# Patient Record
Sex: MALE | Race: WHITE | NOT HISPANIC OR LATINO
[De-identification: names, ages, dates, MRNs, and addresses within clinical notes are randomized per-mention and may not be internally consistent; named-entity substitution may affect disease eponyms.]

---

## 2018-12-04 ENCOUNTER — APPOINTMENT (OUTPATIENT)
Dept: MRI IMAGING | Facility: HOSPITAL | Age: 78
End: 2018-12-04

## 2018-12-04 ENCOUNTER — OUTPATIENT (OUTPATIENT)
Dept: OUTPATIENT SERVICES | Facility: HOSPITAL | Age: 78
LOS: 1 days | End: 2018-12-04
Payer: MEDICARE

## 2018-12-04 DIAGNOSIS — Z00.8 ENCOUNTER FOR OTHER GENERAL EXAMINATION: ICD-10-CM

## 2018-12-04 PROCEDURE — 72148 MRI LUMBAR SPINE W/O DYE: CPT

## 2018-12-04 PROCEDURE — 72148 MRI LUMBAR SPINE W/O DYE: CPT | Mod: 26

## 2018-12-12 ENCOUNTER — OUTPATIENT (OUTPATIENT)
Dept: OUTPATIENT SERVICES | Facility: HOSPITAL | Age: 78
LOS: 1 days | Discharge: HOME | End: 2018-12-12
Payer: MEDICARE

## 2018-12-12 ENCOUNTER — APPOINTMENT (OUTPATIENT)
Dept: HEMATOLOGY ONCOLOGY | Facility: CLINIC | Age: 78
End: 2018-12-12
Payer: MEDICARE

## 2018-12-12 VITALS
HEART RATE: 75 BPM | RESPIRATION RATE: 16 BRPM | WEIGHT: 164.99 LBS | TEMPERATURE: 98 F | OXYGEN SATURATION: 98 % | HEIGHT: 67.76 IN | SYSTOLIC BLOOD PRESSURE: 161 MMHG | DIASTOLIC BLOOD PRESSURE: 79 MMHG | BODY MASS INDEX: 25.3 KG/M2

## 2018-12-12 DIAGNOSIS — Z78.9 OTHER SPECIFIED HEALTH STATUS: ICD-10-CM

## 2018-12-12 DIAGNOSIS — H91.91 UNSPECIFIED HEARING LOSS, RIGHT EAR: ICD-10-CM

## 2018-12-12 PROCEDURE — 99205 OFFICE O/P NEW HI 60 MIN: CPT

## 2018-12-12 NOTE — RESULTS/DATA
[FreeTextEntry1] : MRI L-spine-sclerotic foci at L3 and L4, suspicious for blastic metastases or myeloma. Spondylosis and degenerative spondylolisthesis with spinal stenosis at L4-5 and to a lesser degree L3-4

## 2018-12-12 NOTE — ASSESSMENT
[FreeTextEntry1] : Patient with bone lesions on MRI imaging of the lumbar spine suspicious for neoplastic disease per radiology. Discussed differential diagnosis with patient, along with further evaluation recommended. Rule out metastatic neoplasm-CT scans and PSA ordered. R/O plasma cell dyscrasia-lab work to be done.\par Pending the above, to decide regarding obtaining tissue diagnosis, with further oncologic management recommendations to be made pending this, and the above.\par Patient was given the opportunity to ask questions. His questions have been answered to his apparent satisfaction. Case discussed with Dr. Last-feels bone lesions not related to patient's current neurologic symptoms.

## 2018-12-12 NOTE — CONSULT LETTER
[Dear  ___] : Dear  [unfilled], [Consult Letter:] : I had the pleasure of evaluating your patient, [unfilled]. [Please see my note below.] : Please see my note below. [Consult Closing:] : Thank you very much for allowing me to participate in the care of this patient.  If you have any questions, please do not hesitate to contact me. [Sincerely,] : Sincerely, [FreeTextEntry3] : Louisa Barksdale M.D.

## 2018-12-12 NOTE — REVIEW OF SYSTEMS
[Patient Intake Form Reviewed] : Patient intake form was reviewed [Loss of Hearing] : loss of hearing [Negative] : Allergic/Immunologic [FreeTextEntry9] : LLE pain

## 2018-12-12 NOTE — PHYSICAL EXAM
[Normal] : normal spine exam without palpable tenderness, no kyphosis or scoliosis [de-identified] : superficial varicosities LLE >RLE [de-identified] : +left back nevus-regular border (patient states without any recent change) [de-identified] : no gross focal motor meuro deficits

## 2018-12-12 NOTE — HISTORY OF PRESENT ILLNESS
[de-identified] : 12/2018-Patient presented to a walk-in medical center with a several week history of left lower extremity pain. He was referred to a vascular physician, however, decided to see a neurologist in consultation (Dr. Last). He was placed on steroids which relieved his pain. MRI of the lumbar spine showed sclerotic foci at L3 and L4 most suspicious for blastic metastases or myeloma, or less likely bone islands. Spondylosis and degenerative spondylolisthesis and spinal stenosis at L4-L5 and to a lesser degree L3-L4.\par No complaints of anorexia or unexplained weight loss, fevers/night sweats, lymph node complaints. No pulmonary/GI complaints.

## 2018-12-15 ENCOUNTER — EMERGENCY (EMERGENCY)
Facility: HOSPITAL | Age: 78
LOS: 1 days | Discharge: HOME | End: 2018-12-15
Attending: EMERGENCY MEDICINE | Admitting: EMERGENCY MEDICINE
Payer: MEDICARE

## 2018-12-15 VITALS
HEIGHT: 68 IN | OXYGEN SATURATION: 98 % | SYSTOLIC BLOOD PRESSURE: 159 MMHG | DIASTOLIC BLOOD PRESSURE: 77 MMHG | TEMPERATURE: 98 F | WEIGHT: 154.98 LBS | RESPIRATION RATE: 16 BRPM | HEART RATE: 75 BPM

## 2018-12-15 PROCEDURE — 96372 THER/PROPH/DIAG INJ SC/IM: CPT

## 2018-12-15 PROCEDURE — 99283 EMERGENCY DEPT VISIT LOW MDM: CPT

## 2018-12-15 PROCEDURE — 99283 EMERGENCY DEPT VISIT LOW MDM: CPT | Mod: 25

## 2018-12-15 RX ORDER — KETOROLAC TROMETHAMINE 30 MG/ML
60 SYRINGE (ML) INJECTION ONCE
Qty: 0 | Refills: 0 | Status: DISCONTINUED | OUTPATIENT
Start: 2018-12-15 | End: 2018-12-15

## 2018-12-15 RX ADMIN — Medication 60 MILLIGRAM(S): at 10:49

## 2018-12-15 NOTE — ED PROVIDER NOTE - PHYSICAL EXAMINATION
AAOx3  Heart: s1/s2, RRR  Lung: CTA b/l  Abd: soft, NT/ND, no rebound or guarding, NCVAT  Extremity: no pedal edema or calf pain, no TTP on exam, FROM of all joints, negative SLR. 2+pedal pulses b/l   Neuro: patient moving all extremity equally, good strength and sensation in b/l extremity, no focal neuro deficits noted.    Patient ambulatory in the ED

## 2018-12-15 NOTE — ED PROVIDER NOTE - MEDICAL DECISION MAKING DETAILS
imp- left leg pain, most likely neuropathic pain given lumbar lesion. Patient does not want narcotics   plan- toradol, f/u with Dr. Last, discharge

## 2018-12-15 NOTE — ED ADULT NURSE NOTE - CHPI ED NUR SYMPTOMS NEG
no weakness/no abrasion/no stiffness/no back pain/no bruising/no deformity/no fever/no difficulty bearing weight/no numbness/no tingling

## 2018-12-15 NOTE — ED PROVIDER NOTE - OBJECTIVE STATEMENT
79 y/o M with PMH of BPH and recently diagnosed L3, L4 lesion presents to the ED with c/o intermittent episodes of sharp shooting pains from left mid thigh down to mid calf pain x 1 month, worsened 3 days ago. Patient seen by Dr. Last for this, recently finished a course of steroids yesterday, which he reports helped. Patient also following with Dr. Garza for lumbar lesion, next appointment is in 1 month. He denies bowel/bladder incontinence, paresthesia, numbness, urinary symptoms, abd pain, f/c, n/v or trauma

## 2018-12-15 NOTE — ED ADULT NURSE NOTE - OBJECTIVE STATEMENT
Pt c/o left leg pain from the mid thigh to the mid shin for 1 month worsening over the last 3 days, preventing sleep last night. Pt was seen for this pain and was given and finished a steroid course. Pt states took tylenol this AM and it is not helping the pain but states does not want any addictive medication. Pt presents without swelling, heat or redness to the left leg. Non tender to palpation. ROM normal.

## 2018-12-15 NOTE — ED PROVIDER NOTE - CARE PROVIDER_API CALL
Pawel Last (MD), Neurology  [address]  [address]  [place], [place] [id number]  Phone: [phone]  Fax: [phone]

## 2018-12-15 NOTE — ED PROVIDER NOTE - NSFOLLOWUPINSTRUCTIONS_ED_ALL_ED_FT
Follow up with Dr. Last as discusses and with Dr. Garza as scheduled    Alternate between over the counter Tylenol 1000mg every 6 hours and Motrin 600mg every 6 hours for pain    Stay hydrated    Return to the ER if your symptoms worsen, high fevers, severe pain or for any other medical emergencies

## 2018-12-15 NOTE — ED PROVIDER NOTE - ATTENDING CONTRIBUTION TO CARE
Juan Jose with [facility] owens. Patient with left leg pain, chronic. Alleviated at the start of his steroid course. The taper finished yesterday. He reports no change in the pain but needing pain relief. He does not want narcotic medication. Exam normal. Area of pain is between distal thigh and mid shaft tib/fib. ROM normal. No back tenderness. No fever. Well appearing. Normal gait. Advised tylenol/motrin alternates. Call Dr Last for f/u as there may be other meds that he could advise or recommend. I performed a face to face bedside interview with patient regarding history of present illness, review of symptoms and past medical history. I completed an independent physical exam.  I have discussed the patient's plan of care with Physician Assistant (PA). I agree with note as stated above, having amended the EMR as needed to reflect my findings.   This includes History of Present Illness, HIV, Past Medical/Surgical/Family/Social History, Allergies and Home Medications, Review of Systems, Physical Exam, and any Progress Notes during the time I functioned as the attending physician for this patient.

## 2018-12-16 ENCOUNTER — EMERGENCY (EMERGENCY)
Facility: HOSPITAL | Age: 78
LOS: 1 days | Discharge: HOME | End: 2018-12-16
Attending: EMERGENCY MEDICINE | Admitting: EMERGENCY MEDICINE
Payer: MEDICARE

## 2018-12-16 VITALS
TEMPERATURE: 98 F | HEART RATE: 64 BPM | SYSTOLIC BLOOD PRESSURE: 166 MMHG | OXYGEN SATURATION: 100 % | RESPIRATION RATE: 18 BRPM | DIASTOLIC BLOOD PRESSURE: 84 MMHG

## 2018-12-16 DIAGNOSIS — H91.90 UNSPECIFIED HEARING LOSS, UNSPECIFIED EAR: ICD-10-CM

## 2018-12-16 PROCEDURE — 69210 REMOVE IMPACTED EAR WAX UNI: CPT | Mod: 50

## 2018-12-16 PROCEDURE — 99282 EMERGENCY DEPT VISIT SF MDM: CPT | Mod: 25

## 2018-12-16 PROCEDURE — 99282 EMERGENCY DEPT VISIT SF MDM: CPT

## 2018-12-16 NOTE — ED PROVIDER NOTE - CARE PROVIDER_API CALL
Zuhair Wood), Otolaryngology  [address] [place] [address]  Phone: [phone]  Fax: [phone]

## 2018-12-16 NOTE — ED ADULT NURSE NOTE - NSIMPLEMENTINTERV_GEN_ALL_ED
Implemented All Universal Safety Interventions:  [place] to call system. Call bell, personal items and telephone within reach. Instruct patient to call for assistance. Room bathroom lighting operational. Non-slip footwear when patient is off stretcher. Physically safe environment: no spills, clutter or unnecessary equipment. Stretcher in lowest position, wheels locked, appropriate side rails in place.

## 2018-12-16 NOTE — ED PROVIDER NOTE - PROGRESS NOTE DETAILS
[facility] Clark: ears were irrigated, wax was removed. Patient reports he is able to hear now. he is stable for d.c with ENT f/u

## 2018-12-16 NOTE — ED PROVIDER NOTE - NSFOLLOWUPINSTRUCTIONS_ED_ALL_ED_FT
Follow up with the specialists, call the number above for an appointment   Stay hydrated  Return to the ER if your symptoms worsen, high fevers, severe pain or for any other medical emergencies

## 2018-12-16 NOTE — ED ADULT NURSE NOTE - CHPI ED NUR SYMPTOMS NEG
no loss of consciousness/no syncope/no vomiting/no weakness/no chills/no nausea/no fever/no bleeding gums/no numbness

## 2018-12-16 NOTE — ED PROVIDER NOTE - OBJECTIVE STATEMENT
77 y/o M presents to the ED with decreased hearing and feeling like his ears are clogged x today. Patient reports he used and OTC solvent for wax (possibly debrox irrigation system) today but it worsened his symptoms. He denies ear pain, discharge or bleeding from ears, dizziness, neuro symptoms, HA or all other complaints

## 2018-12-16 NOTE — ED PROVIDER NOTE - ATTENDING CONTRIBUTION TO CARE
Juan Jose with [facility]. Patient here today asking if we can check his left ear. He has been using Debrox without relief. Exam with cerumen impaction. Plan for irrigation. I performed a face to face bedside interview with patient regarding history of present illness, review of symptoms and past medical history. I completed an independent physical exam.  I have discussed the patient's plan of care with Physician Assistant (PA). I agree with note as stated above, having amended the EMR as needed to reflect my findings.   This includes History of Present Illness, HIV, Past Medical/Surgical/Family/Social History, Allergies and Home Medications, Review of Systems, Physical Exam, and any Progress Notes during the time I functioned as the attending physician for this patient.

## 2018-12-17 PROBLEM — N40.0 BENIGN PROSTATIC HYPERPLASIA WITHOUT LOWER URINARY TRACT SYMPTOMS: Chronic | Status: ACTIVE | Noted: 2018-12-15

## 2019-01-03 ENCOUNTER — LABORATORY RESULT (OUTPATIENT)
Age: 79
End: 2019-01-03

## 2019-01-04 ENCOUNTER — APPOINTMENT (OUTPATIENT)
Dept: ORTHOPEDIC SURGERY | Facility: CLINIC | Age: 79
End: 2019-01-04
Payer: MEDICARE

## 2019-01-04 VITALS — HEIGHT: 67 IN | BODY MASS INDEX: 25.74 KG/M2 | WEIGHT: 164 LBS

## 2019-01-04 DIAGNOSIS — Z00.00 ENCOUNTER FOR GENERAL ADULT MEDICAL EXAMINATION W/OUT ABNORMAL FINDINGS: ICD-10-CM

## 2019-01-04 PROCEDURE — 99204 OFFICE O/P NEW MOD 45 MIN: CPT

## 2019-01-04 PROCEDURE — 72110 X-RAY EXAM L-2 SPINE 4/>VWS: CPT

## 2019-01-04 PROCEDURE — 72170 X-RAY EXAM OF PELVIS: CPT

## 2019-01-07 ENCOUNTER — FORM ENCOUNTER (OUTPATIENT)
Age: 79
End: 2019-01-07

## 2019-01-08 ENCOUNTER — OUTPATIENT (OUTPATIENT)
Dept: OUTPATIENT SERVICES | Facility: HOSPITAL | Age: 79
LOS: 1 days | End: 2019-01-08
Payer: MEDICARE

## 2019-01-08 DIAGNOSIS — M89.9 DISORDER OF BONE, UNSPECIFIED: ICD-10-CM

## 2019-01-08 PROCEDURE — A9561: CPT

## 2019-01-08 PROCEDURE — 78306 BONE IMAGING WHOLE BODY: CPT | Mod: 26

## 2019-01-08 PROCEDURE — 78306 BONE IMAGING WHOLE BODY: CPT

## 2019-01-09 ENCOUNTER — FORM ENCOUNTER (OUTPATIENT)
Age: 79
End: 2019-01-09

## 2019-01-09 ENCOUNTER — OUTPATIENT (OUTPATIENT)
Dept: OUTPATIENT SERVICES | Facility: HOSPITAL | Age: 79
LOS: 1 days | Discharge: HOME | End: 2019-01-09

## 2019-01-09 DIAGNOSIS — C43.8 MALIGNANT MELANOMA OF OVERLAPPING SITES OF SKIN: ICD-10-CM

## 2019-01-10 ENCOUNTER — OUTPATIENT (OUTPATIENT)
Dept: OUTPATIENT SERVICES | Facility: HOSPITAL | Age: 79
LOS: 1 days | End: 2019-01-10
Payer: MEDICARE

## 2019-01-10 DIAGNOSIS — M89.9 DISORDER OF BONE, UNSPECIFIED: ICD-10-CM

## 2019-01-10 PROCEDURE — 71260 CT THORAX DX C+: CPT

## 2019-01-10 PROCEDURE — 71260 CT THORAX DX C+: CPT | Mod: 26

## 2019-01-10 PROCEDURE — 74177 CT ABD & PELVIS W/CONTRAST: CPT | Mod: 26

## 2019-01-10 PROCEDURE — 74177 CT ABD & PELVIS W/CONTRAST: CPT

## 2019-01-15 ENCOUNTER — APPOINTMENT (OUTPATIENT)
Dept: HEMATOLOGY ONCOLOGY | Facility: CLINIC | Age: 79
End: 2019-01-15
Payer: MEDICARE

## 2019-01-15 VITALS
TEMPERATURE: 97.8 F | BODY MASS INDEX: 25.34 KG/M2 | OXYGEN SATURATION: 100 % | RESPIRATION RATE: 17 BRPM | HEART RATE: 69 BPM | DIASTOLIC BLOOD PRESSURE: 83 MMHG | SYSTOLIC BLOOD PRESSURE: 155 MMHG | WEIGHT: 161.82 LBS

## 2019-01-15 DIAGNOSIS — M89.9 DISORDER OF BONE, UNSPECIFIED: ICD-10-CM

## 2019-01-15 PROCEDURE — 99214 OFFICE O/P EST MOD 30 MIN: CPT

## 2019-01-16 NOTE — HISTORY OF PRESENT ILLNESS
[de-identified] : 12/2018-Patient presented to a walk-in medical center with a several week history of left lower extremity pain. He was referred to a vascular physician, however, decided to see a neurologist in consultation (Dr. Last). He was placed on steroids which relieved his pain. MRI of the lumbar spine showed sclerotic foci at L3 and L4 most suspicious for blastic metastases or myeloma, or less likely bone islands. Spondylosis and degenerative spondylolisthesis and spinal stenosis at L4-L5 and to a lesser degree L3-L4.\par  [de-identified] : Dr. Kitchen (PCP) is managing patient's pain-ordering medication for him. Taking approx. 7-8 oxycodone tabs daily for the LLE pain which goes up/down the leg variably. Told has spinal stenosis but does not wish to pursue surgery. Has been referred for PT.\par No fevers/recent infection. No bleeding. [7 - Distress Level] : Distress Level: 7 [Referred Patient  to social work for follow-up] : Patient was referred to social work for follow-up

## 2019-01-16 NOTE — REVIEW OF SYSTEMS
[Negative] : Allergic/Immunologic [Mucosal Pain] : no mucosal pain [Fainting] : no fainting [FreeTextEntry9] : LLE pain

## 2019-01-16 NOTE — RESULTS/DATA
[FreeTextEntry1] : Whole-body bone scan-trauma anterior aspect of 2 left lower ribs (patient confirms history of trauma to this area); faintly increased uptake in the midthoracic spine, approximate T9. No abnormally increased or decreased radiopharmaceutical accumulation corresponding to the L3-L4 abnormalities identified on recent MRI. No other osseous abnormalities. \par CT chest/abdomen/pelvis-sclerotic foci in the bilateral iliac and ischial bones as well as right sacrum. Sclerotic lesions within L4 vertebral body and posterior elements of L3. No discrete osteolytic or osteosclerotic lesion at T9. Clustered ill-defined nodular opacities in the lung, indeterminate in etiology. Followup CT evaluation in 6 months suggested by radiology. 9 mm gastrohepatic lymph node and a 9 mm lymph node anterior to the distal thoracic esophagus noted.

## 2019-01-16 NOTE — ASSESSMENT
[FreeTextEntry1] : Bone lesions-as has been discussed with interventional radiology (Dr. Fry)-bone lesions radiographically may represent benign process, as well as pulmonary abnormality. Interval followup imaging to be done-patient expressed his understanding of this recommendation.\par Discussed differential diagnosis of IgM monoclonal gammopathy-clinically suspect this may represent MGUS, however, with bone issues as well, do think it prudent to pursue a bone marrow aspiration/biopsy to which patient consents. Indication, potential benefits and side effects of bone marrow procedure explained.\par Patient was given the opportunity to ask questions. His questions have been answered to his apparent satisfaction at this time.

## 2019-01-18 ENCOUNTER — OUTPATIENT (OUTPATIENT)
Dept: OUTPATIENT SERVICES | Facility: HOSPITAL | Age: 79
LOS: 1 days | End: 2019-01-18
Payer: MEDICARE

## 2019-01-18 DIAGNOSIS — M54.5 LOW BACK PAIN: ICD-10-CM

## 2019-01-30 ENCOUNTER — APPOINTMENT (OUTPATIENT)
Dept: HEMATOLOGY ONCOLOGY | Facility: CLINIC | Age: 79
End: 2019-01-30

## 2019-02-08 ENCOUNTER — APPOINTMENT (OUTPATIENT)
Dept: ORTHOPEDIC SURGERY | Facility: CLINIC | Age: 79
End: 2019-02-08
Payer: MEDICARE

## 2019-02-08 VITALS — WEIGHT: 161 LBS | HEIGHT: 67 IN | BODY MASS INDEX: 25.27 KG/M2

## 2019-02-08 DIAGNOSIS — D47.2 MONOCLONAL GAMMOPATHY: ICD-10-CM

## 2019-02-08 DIAGNOSIS — M48.061 SPINAL STENOSIS, LUMBAR REGION WITHOUT NEUROGENIC CLAUDICATION: ICD-10-CM

## 2019-02-08 PROCEDURE — 99213 OFFICE O/P EST LOW 20 MIN: CPT

## 2019-02-13 ENCOUNTER — OUTPATIENT (OUTPATIENT)
Dept: OUTPATIENT SERVICES | Facility: HOSPITAL | Age: 79
LOS: 1 days | Discharge: HOME | End: 2019-02-13

## 2019-02-13 DIAGNOSIS — C43.8 MALIGNANT MELANOMA OF OVERLAPPING SITES OF SKIN: ICD-10-CM

## 2019-02-15 ENCOUNTER — APPOINTMENT (OUTPATIENT)
Dept: ORTHOPEDIC SURGERY | Facility: CLINIC | Age: 79
End: 2019-02-15
Payer: MEDICARE

## 2019-02-15 VITALS — HEIGHT: 67 IN | WEIGHT: 161 LBS | BODY MASS INDEX: 25.27 KG/M2

## 2019-02-15 DIAGNOSIS — M54.16 RADICULOPATHY, LUMBAR REGION: ICD-10-CM

## 2019-02-15 PROCEDURE — 99213 OFFICE O/P EST LOW 20 MIN: CPT

## 2019-02-25 ENCOUNTER — APPOINTMENT (OUTPATIENT)
Dept: HEMATOLOGY ONCOLOGY | Facility: CLINIC | Age: 79
End: 2019-02-25

## 2019-03-13 NOTE — DISCUSSION/SUMMARY
[de-identified] : At this time, he will be sent for an Epidural injection and a script was provided. He is to bring a copy of his MRI and EMG report with him to the injection. We discussed the use of ice to relieve pain on his back. He will continue with physical therapy for strengthening and flexibility of both knees and continue with home exercises. He will also continue to walk with a cane in his right hand to prevent falls. Follow-up 2 weeks after his Epidural injection. WE have cautioned him to use the can and be cautius while walking for fear of falling.

## 2019-03-13 NOTE — PHYSICAL EXAM
[LE] : Sensory: Intact in bilateral lower extremities [de-identified] : Constitutional\par o Appearance : well-nourished, well developed, alert, in no acute distress \par Head and Face\par o Head :\par ¦ Inspection : atraumatic, normocephalic\par o Face :\par ¦ Inspection : no visible rash or discoloration\par Lymphatic\par o Epitrochlear Nodes : no lymphadenopathy \par o Popliteal Nodes : no palpable nodes present \par o Supraclavicular Nodes : no supraclavicular nodes present \par o Preauricular Nodes : no preauricular nodes present \par o Additional Nodes : no additional adenopathy present \par Skin and Subcutaneous Tissue \par o Head and Neck :\par ¦ Face : no facial lesions \par o Trunk :\par ¦ Back : no rashes present, no lesions present, no areas of discoloration \par Body Hair : body hair distribution normal for age\par Neurologic\par o Mental Status Examination :\par ¦ Orientation : alert and oriented X 3\par o Coordination : finger-to-nose-to-finger testing normal bilaterally, heel-shin cerebellar test within normal limits bilaterally \par Psychiatric\par o Mood and Affect: mood normal, affect appropriate \par Cardiovascular\par o Peripheral Vascular System :\par ¦ Femoral Artery : pulse 2+\par ¦ Dorsalis Pedis Artery : pulse 2+\par ¦ Posterior Tibial Artery : pulse 2+\par \par Lumbosacral Spine\par o Inspection : no lesions or deformities\par o Palpation : paraspinal musculature tender, left side worse than right \par o Stability : no subluxations present\par o Range of Motion : full ROM, extension and sidebending does not cause discomfort, flexion causes discomfort\par o Tests: Straight leg raising and Elis tests are negative, patella tendon reflex diminished on the left \par Patella Tendon Reflex: 2+ on the right, 1 on left\par Achilles Tendon Reflex: 2+ bilaterally\par Normal sensation for pin prick\par \par Right Lower Extremity\par o Buttock : no tenderness, swelling or deformities \par o Right Hip :\par ¦ Inspection/Palpation : no tenderness, swelling or deformities\par ¦ Range of Motion : full and painless in all planes, no crepitance\par ¦ Stability : joint stability intact\par ¦ Strength : extension, flexion, adduction, abduction, internal rotation and external rotation 4+/5 \par \par o Right Knee :\par ¦ Inspection/Palpation : no tenderness to palpation, no swelling, very tight hamstring\par ¦ Range of Motion : active flexion and extension full and painless, no crepitance\par ¦ Stability : no valgus or varus instability present on provocative testing\par ¦ Strength : flexion and extension 5/5,  no foot drop, ankle dorsiflexion and plantarflexion 5/5, ankle 5/5\par ¦ Tests and Signs : all tests for stability normal, no foot drop\par \par Left Lower Extremity\par o Buttock : no tenderness, swelling or deformities \par o Left Hip :\par ¦ Inspection/Palpation : no tenderness, no swelling or deformities\par ¦ Range of Motion : full and painless in all planes, no crepitance\par ¦ Stability : joint stability intact\par ¦ Strength : extension, flexion, adduction, abduction, internal rotation and external rotation 3-/5, can barely lift the leg. \par \par o Left Knee :\par ¦ Inspection/Palpation : no tenderness to palpation, no swelling, very tight hamstring\par ¦ Range of Motion : active flexion and extension full and painless, no crepitance, 15 degree extensor lag\par ¦ Stability : no valgus or varus instability present on provocative testing\par ¦ Strength : flexion and extension 3/5, no foot drop, ankle dorsiflexion and plantarflexion 4/5, ankle 5/5\par ¦ Tests and Signs : all tests for stability normal, no foot drop\par Patella tendon reflex decreased on the left one as opposed to 2+ on the right.  Achilles tendon reflex is symmetrical bilaterally\par \par Gait: abductor lurch on the left, walks with a cane in the right hand, kyphotic no significant extremity swelling or lymphedema, good core strength, very tight hamstrings bilaterally. Very poor initiation of motion due to weakness of the left leg. \par

## 2019-03-13 NOTE — ADDENDUM
[FreeTextEntry1] : I, Sam Dixon , acted solely as a scribe for Dr. Rayo Campoverde on this date 02/15/2019.\par All medical record entries made by the Scribe were at my, Dr. Rayo Campoverde, direction and personally dictated by me on 02/15/2019. I have reviewed the chart and agree that the record accurately reflects my personal performance of the history, physical exam, assessment and plan. I have also personally directed, reviewed, and agreed with the chart.

## 2019-03-13 NOTE — HISTORY OF PRESENT ILLNESS
[de-identified] : Patient went for an EMG/nerve conduction study of his lumbosacral spine which revealed a pinched nerve at the L4-5 level on the left side. The pain is overall unchanged, he still feels pain when standing. He walks with a cane in the right hand. He would like to discuss the possibility of an Epidural injection.He did have EMGNCS that revealed an L4/5 radiculopathy on the left. It appears unrelated to his metastatic CA.

## 2019-03-27 PROCEDURE — 97116 GAIT TRAINING THERAPY: CPT

## 2019-03-27 PROCEDURE — 97110 THERAPEUTIC EXERCISES: CPT

## 2019-03-27 PROCEDURE — 97161 PT EVAL LOW COMPLEX 20 MIN: CPT

## 2019-03-27 PROCEDURE — 97140 MANUAL THERAPY 1/> REGIONS: CPT

## 2019-04-05 ENCOUNTER — APPOINTMENT (OUTPATIENT)
Dept: ORTHOPEDIC SURGERY | Facility: CLINIC | Age: 79
End: 2019-04-05
Payer: MEDICARE

## 2019-04-05 VITALS — HEIGHT: 67 IN | BODY MASS INDEX: 25.27 KG/M2 | WEIGHT: 161 LBS

## 2019-04-05 DIAGNOSIS — M47.816 SPONDYLOSIS W/OUT MYELOPATHY OR RADICULOPATHY, LUMBAR REGION: ICD-10-CM

## 2019-04-05 DIAGNOSIS — M76.892 OTHER SPECIFIED ENTHESOPATHIES OF LEFT LOWER LIMB, EXCLUDING FOOT: ICD-10-CM

## 2019-04-05 PROCEDURE — 99213 OFFICE O/P EST LOW 20 MIN: CPT

## 2021-05-13 ENCOUNTER — APPOINTMENT (OUTPATIENT)
Dept: CARDIOLOGY | Facility: CLINIC | Age: 81
End: 2021-05-13
Payer: MEDICARE

## 2021-05-13 ENCOUNTER — NON-APPOINTMENT (OUTPATIENT)
Age: 81
End: 2021-05-13

## 2021-05-13 VITALS
WEIGHT: 169 LBS | RESPIRATION RATE: 16 BRPM | DIASTOLIC BLOOD PRESSURE: 71 MMHG | HEIGHT: 67 IN | BODY MASS INDEX: 26.53 KG/M2 | HEART RATE: 63 BPM | SYSTOLIC BLOOD PRESSURE: 146 MMHG | TEMPERATURE: 97.1 F | OXYGEN SATURATION: 99 %

## 2021-05-13 VITALS — HEART RATE: 68 BPM | SYSTOLIC BLOOD PRESSURE: 126 MMHG | DIASTOLIC BLOOD PRESSURE: 78 MMHG

## 2021-05-13 PROCEDURE — 99204 OFFICE O/P NEW MOD 45 MIN: CPT

## 2021-05-13 PROCEDURE — 93306 TTE W/DOPPLER COMPLETE: CPT

## 2021-05-13 PROCEDURE — 93000 ELECTROCARDIOGRAM COMPLETE: CPT

## 2021-05-13 NOTE — REASON FOR VISIT
[Other: ____] : [unfilled] [FreeTextEntry1] : This is an 80-year-old man referred for cardiac evaluation. The patient has recently been diagnosed with something that he states he is lives with all his life which is ADHD. He is being considered for some type of medical therapy. He is in general good health. He has no history of hypertension diabetes or smoking. He is a moderate alcohol user. Family history significant for a father who  in his 90s but had bypass surgery in his 70s. His grandfather  age 75 of a myocardial infarction.

## 2021-05-13 NOTE — ASSESSMENT
[FreeTextEntry1] : The patient was referred to the echocardiography laboratory. 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

## 2021-05-19 ENCOUNTER — APPOINTMENT (OUTPATIENT)
Dept: CARDIOLOGY | Facility: CLINIC | Age: 81
End: 2021-05-19
Payer: MEDICARE

## 2021-05-19 LAB
ALBUMIN SERPL ELPH-MCNC: 4.6 G/DL
ALP BLD-CCNC: 63 U/L
ALT SERPL-CCNC: 19 U/L
ANION GAP SERPL CALC-SCNC: 14 MMOL/L
AST SERPL-CCNC: 20 U/L
BASOPHILS # BLD AUTO: 0.05 K/UL
BASOPHILS NFR BLD AUTO: 0.5 %
BILIRUB SERPL-MCNC: 0.5 MG/DL
BUN SERPL-MCNC: 18 MG/DL
CALCIUM SERPL-MCNC: 9.7 MG/DL
CHLORIDE SERPL-SCNC: 100 MMOL/L
CHOLEST SERPL-MCNC: 166 MG/DL
CO2 SERPL-SCNC: 23 MMOL/L
CREAT SERPL-MCNC: 1.17 MG/DL
EOSINOPHIL # BLD AUTO: 0.13 K/UL
EOSINOPHIL NFR BLD AUTO: 1.2 %
GLUCOSE SERPL-MCNC: 89 MG/DL
HCT VFR BLD CALC: 40.7 %
HDLC SERPL-MCNC: 68 MG/DL
HGB BLD-MCNC: 13.3 G/DL
IMM GRANULOCYTES NFR BLD AUTO: 0.3 %
LDLC SERPL CALC-MCNC: 91 MG/DL
LYMPHOCYTES # BLD AUTO: 3.77 K/UL
LYMPHOCYTES NFR BLD AUTO: 36.1 %
MAN DIFF?: NORMAL
MCHC RBC-ENTMCNC: 30 PG
MCHC RBC-ENTMCNC: 32.7 GM/DL
MCV RBC AUTO: 91.7 FL
MONOCYTES # BLD AUTO: 0.75 K/UL
MONOCYTES NFR BLD AUTO: 7.2 %
NEUTROPHILS # BLD AUTO: 5.72 K/UL
NEUTROPHILS NFR BLD AUTO: 54.7 %
NONHDLC SERPL-MCNC: 98 MG/DL
PLATELET # BLD AUTO: 273 K/UL
POTASSIUM SERPL-SCNC: 4.4 MMOL/L
PROT SERPL-MCNC: 6.6 G/DL
RBC # BLD: 4.44 M/UL
RBC # FLD: 13.2 %
SODIUM SERPL-SCNC: 137 MMOL/L
T3 SERPL-MCNC: 77 NG/DL
T4 SERPL-MCNC: 6.4 UG/DL
TRIGL SERPL-MCNC: 37 MG/DL
TSH SERPL-ACNC: 3.9 UIU/ML
WBC # FLD AUTO: 10.45 K/UL

## 2021-05-19 PROCEDURE — 93015 CV STRESS TEST SUPVJ I&R: CPT

## 2022-06-21 ENCOUNTER — NON-APPOINTMENT (OUTPATIENT)
Age: 82
End: 2022-06-21

## 2022-06-21 ENCOUNTER — APPOINTMENT (OUTPATIENT)
Dept: CARDIOLOGY | Facility: CLINIC | Age: 82
End: 2022-06-21
Payer: MEDICARE

## 2022-06-21 VITALS
RESPIRATION RATE: 19 BRPM | OXYGEN SATURATION: 100 % | WEIGHT: 167 LBS | BODY MASS INDEX: 26.21 KG/M2 | HEIGHT: 67 IN | HEART RATE: 70 BPM | TEMPERATURE: 97.2 F

## 2022-06-21 VITALS — DIASTOLIC BLOOD PRESSURE: 78 MMHG | SYSTOLIC BLOOD PRESSURE: 130 MMHG | HEART RATE: 60 BPM

## 2022-06-21 DIAGNOSIS — F90.9 ATTENTION-DEFICIT HYPERACTIVITY DISORDER, UNSPECIFIED TYPE: ICD-10-CM

## 2022-06-21 PROCEDURE — 93000 ELECTROCARDIOGRAM COMPLETE: CPT

## 2022-06-21 PROCEDURE — 99214 OFFICE O/P EST MOD 30 MIN: CPT

## 2022-06-21 NOTE — ASSESSMENT
[FreeTextEntry1] : In summary, the patient is an 81-year-old man with no known history of heart disease and with a favorable noninvasive workup approximately one year ago.\par \par There is no cardiac contraindication to the use of Ritalin

## 2022-06-21 NOTE — REASON FOR VISIT
[FreeTextEntry1] : Patient presents for cardiac clearance to go on Ritalin for adhd. He has no complaints of chest discomfort shortness of breath palpitations dizziness or syncope. He has no prior cardiac history. He underwent a noninvasive workup including echocardiography and exercise stress testing approximately one year ago with favorable results. Takes no other medications.

## 2022-07-13 NOTE — ED ADULT NURSE NOTE - EXTENSIONS OF SELF_ADULT
Skyrizi Counseling: I discussed with the patient the risks of risankizumab-rzaa including but not limited to immunosuppression, and serious infections.  The patient understands that monitoring is required including a PPD at baseline and must alert us or the primary physician if symptoms of infection or other concerning signs are noted. None

## 2022-11-20 NOTE — ED PROVIDER NOTE - PHYSICAL EXAMINATION
Pt. Arrives to the ED endorsing chest pain x 3 days. Pt. Describes it as a crushing pain. Denies any cardiovascular hx. AAOx3  Heart: s1/s2, RRR  Lung: CTA b/l  Ears: cerumen impaction in b/l ears, unable to visualize TMs AAOx3  Heart: s1/s2, RRR  Lung: CTA b/l  Ears: cerumen impaction in b/l ears, unable to visualize TMs. no tenderness with tragus manipulation or insertion of otoscope

## 2023-01-13 ENCOUNTER — APPOINTMENT (OUTPATIENT)
Dept: SURGERY | Facility: CLINIC | Age: 83
End: 2023-01-13
Payer: MEDICARE

## 2023-01-13 VITALS — BODY MASS INDEX: 25.55 KG/M2 | TEMPERATURE: 97.3 F | HEIGHT: 66 IN | WEIGHT: 159 LBS

## 2023-01-13 DIAGNOSIS — R00.1 BRADYCARDIA, UNSPECIFIED: ICD-10-CM

## 2023-01-13 DIAGNOSIS — M43.17 SPONDYLOLISTHESIS, LUMBOSACRAL REGION: ICD-10-CM

## 2023-01-13 DIAGNOSIS — R94.31 ABNORMAL ELECTROCARDIOGRAM [ECG] [EKG]: ICD-10-CM

## 2023-01-13 DIAGNOSIS — Z87.438 PERSONAL HISTORY OF OTHER DISEASES OF MALE GENITAL ORGANS: ICD-10-CM

## 2023-01-13 PROCEDURE — 99203 OFFICE O/P NEW LOW 30 MIN: CPT

## 2023-01-13 NOTE — ASSESSMENT
[FreeTextEntry1] : Long discussion regarding all options and risks\par To undergo repair of RIH on 1/19/23\par Discussed with Medicine\par All lab values and imaging studies reviewed

## 2023-01-13 NOTE — PHYSICAL EXAM
[Normal Breath Sounds] : Normal breath sounds [Normal Heart Sounds] : normal heart sounds [Normal Rate and Rhythm] : normal rate and rhythm [Abdominal Masses] : No abdominal masses [Abdomen Tenderness] : ~T ~M No abdominal tenderness [No Rash or Lesion] : No rash or lesion [de-identified] : nl [de-identified] : nl [de-identified] : difficult to reduce RIH [de-identified] : nl

## 2023-01-13 NOTE — HISTORY OF PRESENT ILLNESS
[de-identified] : This 82 year old man has been aware of a RIH for the past several years. recently, the RIH has been uncomfortable and difficult to reduce. The patient denied any recent change in bowel movements.

## 2023-01-27 ENCOUNTER — OUTPATIENT (OUTPATIENT)
Dept: OUTPATIENT SERVICES | Facility: HOSPITAL | Age: 83
LOS: 1 days | End: 2023-01-27
Payer: MEDICARE

## 2023-01-27 VITALS
RESPIRATION RATE: 16 BRPM | DIASTOLIC BLOOD PRESSURE: 62 MMHG | WEIGHT: 163.14 LBS | HEART RATE: 56 BPM | TEMPERATURE: 97 F | OXYGEN SATURATION: 96 % | HEIGHT: 68 IN | SYSTOLIC BLOOD PRESSURE: 130 MMHG

## 2023-01-27 DIAGNOSIS — Z01.818 ENCOUNTER FOR OTHER PREPROCEDURAL EXAMINATION: ICD-10-CM

## 2023-01-27 DIAGNOSIS — K40.90 UNILATERAL INGUINAL HERNIA, WITHOUT OBSTRUCTION OR GANGRENE, NOT SPECIFIED AS RECURRENT: ICD-10-CM

## 2023-01-27 DIAGNOSIS — Z98.890 OTHER SPECIFIED POSTPROCEDURAL STATES: Chronic | ICD-10-CM

## 2023-01-27 LAB
ANION GAP SERPL CALC-SCNC: 9 MMOL/L — SIGNIFICANT CHANGE UP (ref 5–17)
BUN SERPL-MCNC: 32 MG/DL — HIGH (ref 7–23)
CALCIUM SERPL-MCNC: 9.4 MG/DL — SIGNIFICANT CHANGE UP (ref 8.4–10.5)
CHLORIDE SERPL-SCNC: 103 MMOL/L — SIGNIFICANT CHANGE UP (ref 96–108)
CO2 SERPL-SCNC: 27 MMOL/L — SIGNIFICANT CHANGE UP (ref 22–31)
CREAT SERPL-MCNC: 1.13 MG/DL — SIGNIFICANT CHANGE UP (ref 0.5–1.3)
EGFR: 65 ML/MIN/1.73M2 — SIGNIFICANT CHANGE UP
GLUCOSE SERPL-MCNC: 95 MG/DL — SIGNIFICANT CHANGE UP (ref 70–99)
HCT VFR BLD CALC: 40.9 % — SIGNIFICANT CHANGE UP (ref 39–50)
HGB BLD-MCNC: 13.6 G/DL — SIGNIFICANT CHANGE UP (ref 13–17)
MCHC RBC-ENTMCNC: 29.7 PG — SIGNIFICANT CHANGE UP (ref 27–34)
MCHC RBC-ENTMCNC: 33.3 GM/DL — SIGNIFICANT CHANGE UP (ref 32–36)
MCV RBC AUTO: 89.3 FL — SIGNIFICANT CHANGE UP (ref 80–100)
NRBC # BLD: 0 /100 WBCS — SIGNIFICANT CHANGE UP (ref 0–0)
PLATELET # BLD AUTO: 263 K/UL — SIGNIFICANT CHANGE UP (ref 150–400)
POTASSIUM SERPL-MCNC: 4.3 MMOL/L — SIGNIFICANT CHANGE UP (ref 3.5–5.3)
POTASSIUM SERPL-SCNC: 4.3 MMOL/L — SIGNIFICANT CHANGE UP (ref 3.5–5.3)
RBC # BLD: 4.58 M/UL — SIGNIFICANT CHANGE UP (ref 4.2–5.8)
RBC # FLD: 12.3 % — SIGNIFICANT CHANGE UP (ref 10.3–14.5)
SODIUM SERPL-SCNC: 139 MMOL/L — SIGNIFICANT CHANGE UP (ref 135–145)
WBC # BLD: 9.56 K/UL — SIGNIFICANT CHANGE UP (ref 3.8–10.5)
WBC # FLD AUTO: 9.56 K/UL — SIGNIFICANT CHANGE UP (ref 3.8–10.5)

## 2023-01-27 PROCEDURE — 85027 COMPLETE CBC AUTOMATED: CPT

## 2023-01-27 PROCEDURE — 36415 COLL VENOUS BLD VENIPUNCTURE: CPT

## 2023-01-27 PROCEDURE — G0463: CPT

## 2023-01-27 PROCEDURE — 80048 BASIC METABOLIC PNL TOTAL CA: CPT

## 2023-01-27 NOTE — H&P PST ADULT - WEIGHT IN LBS
Telephone Encounter by Chary Lara at 11/02/17 01:44 PM     Author:  Chary Lara Service:  (none) Author Type:  Patient      Filed:  11/02/17 01:44 PM Encounter Date:  11/1/2017 Status:  Signed     :  Chary Lara (Patient )            Spoke with patient.  Patient will call back to schedule appointment.[RB1.1M]       Revision History        User Key Date/Time User Provider Type Action    > RB1.1 11/02/17 01:44 PM Chary Lara Patient  Sign    M - Manual
163.1
No

## 2023-01-27 NOTE — H&P PST ADULT - NSICDXPASTMEDICALHX_GEN_ALL_CORE_FT
PAST MEDICAL HISTORY:  Benign prostatic hyperplasia, unspecified whether lower urinary tract symptoms present     Unilateral inguinal hernia, without obstruction or gangrene, not specified as recurrent

## 2023-01-27 NOTE — H&P PST ADULT - HISTORY OF PRESENT ILLNESS
This is an 83 y/o male with pre-operative diagnosis of right inguinal hernia.  Pt reports bulge in right groin for a few years, but now has become symptomatic.  Denies any changes bowel habits.  Otherwise patient feels well today.

## 2023-01-27 NOTE — H&P PST ADULT - NSANTHOSAYNRD_GEN_A_CORE
No. KINGA screening performed.  STOP BANG Legend: 0-2 = LOW Risk; 3-4 = INTERMEDIATE Risk; 5-8 = HIGH Risk

## 2023-02-01 ENCOUNTER — TRANSCRIPTION ENCOUNTER (OUTPATIENT)
Age: 83
End: 2023-02-01

## 2023-02-02 ENCOUNTER — RESULT REVIEW (OUTPATIENT)
Age: 83
End: 2023-02-02

## 2023-02-02 ENCOUNTER — TRANSCRIPTION ENCOUNTER (OUTPATIENT)
Age: 83
End: 2023-02-02

## 2023-02-02 ENCOUNTER — APPOINTMENT (OUTPATIENT)
Dept: SURGERY | Facility: HOSPITAL | Age: 83
End: 2023-02-02

## 2023-02-02 ENCOUNTER — OUTPATIENT (OUTPATIENT)
Dept: OUTPATIENT SERVICES | Facility: HOSPITAL | Age: 83
LOS: 1 days | End: 2023-02-02
Payer: MEDICARE

## 2023-02-02 VITALS
HEIGHT: 68 IN | WEIGHT: 163.14 LBS | HEART RATE: 64 BPM | DIASTOLIC BLOOD PRESSURE: 73 MMHG | TEMPERATURE: 98 F | RESPIRATION RATE: 15 BRPM | SYSTOLIC BLOOD PRESSURE: 160 MMHG | OXYGEN SATURATION: 99 %

## 2023-02-02 VITALS
SYSTOLIC BLOOD PRESSURE: 121 MMHG | RESPIRATION RATE: 14 BRPM | OXYGEN SATURATION: 95 % | HEART RATE: 66 BPM | TEMPERATURE: 98 F | DIASTOLIC BLOOD PRESSURE: 72 MMHG

## 2023-02-02 DIAGNOSIS — Z98.890 OTHER SPECIFIED POSTPROCEDURAL STATES: Chronic | ICD-10-CM

## 2023-02-02 DIAGNOSIS — K40.90 UNILATERAL INGUINAL HERNIA, WITHOUT OBSTRUCTION OR GANGRENE, NOT SPECIFIED AS RECURRENT: ICD-10-CM

## 2023-02-02 DIAGNOSIS — D10.1 BENIGN NEOPLASM OF TONGUE: Chronic | ICD-10-CM

## 2023-02-02 PROCEDURE — 49505 PRP I/HERN INIT REDUC >5 YR: CPT | Mod: RT

## 2023-02-02 PROCEDURE — 88302 TISSUE EXAM BY PATHOLOGIST: CPT

## 2023-02-02 PROCEDURE — 49505 PRP I/HERN INIT REDUC >5 YR: CPT | Mod: AS

## 2023-02-02 PROCEDURE — C1781: CPT

## 2023-02-02 PROCEDURE — 88302 TISSUE EXAM BY PATHOLOGIST: CPT | Mod: 26

## 2023-02-02 PROCEDURE — 49505 PRP I/HERN INIT REDUC >5 YR: CPT

## 2023-02-02 DEVICE — MESH HERNIA PERFIX PLUG EXTRA LARGE 1.6 X 2": Type: IMPLANTABLE DEVICE | Status: FUNCTIONAL

## 2023-02-02 RX ORDER — SODIUM CHLORIDE 9 MG/ML
1000 INJECTION, SOLUTION INTRAVENOUS
Refills: 0 | Status: DISCONTINUED | OUTPATIENT
Start: 2023-02-02 | End: 2023-02-02

## 2023-02-02 RX ORDER — CHOLECALCIFEROL (VITAMIN D3) 125 MCG
1 CAPSULE ORAL
Qty: 0 | Refills: 0 | DISCHARGE

## 2023-02-02 RX ORDER — HYDROMORPHONE HYDROCHLORIDE 2 MG/ML
0.5 INJECTION INTRAMUSCULAR; INTRAVENOUS; SUBCUTANEOUS
Refills: 0 | Status: DISCONTINUED | OUTPATIENT
Start: 2023-02-02 | End: 2023-02-02

## 2023-02-02 RX ORDER — ONDANSETRON 8 MG/1
4 TABLET, FILM COATED ORAL ONCE
Refills: 0 | Status: DISCONTINUED | OUTPATIENT
Start: 2023-02-02 | End: 2023-02-02

## 2023-02-02 RX ORDER — TAMSULOSIN HYDROCHLORIDE 0.4 MG/1
1 CAPSULE ORAL
Qty: 0 | Refills: 0 | DISCHARGE

## 2023-02-02 RX ORDER — OXYCODONE HYDROCHLORIDE 5 MG/1
1 TABLET ORAL
Qty: 6 | Refills: 0
Start: 2023-02-02 | End: 2023-02-04

## 2023-02-02 RX ORDER — SENNA PLUS 8.6 MG/1
1 TABLET ORAL
Qty: 0 | Refills: 0 | DISCHARGE

## 2023-02-02 RX ADMIN — SODIUM CHLORIDE 50 MILLILITER(S): 9 INJECTION, SOLUTION INTRAVENOUS at 07:38

## 2023-02-02 NOTE — ASU DISCHARGE PLAN (ADULT/PEDIATRIC) - NS MD DC FALL RISK RISK
For information on Fall & Injury Prevention, visit: [email or web address] OR  [email or web address] OR  https://www.cdc.gov/steadi/patient.html

## 2023-02-02 NOTE — ASU DISCHARGE PLAN (ADULT/PEDIATRIC) - CALL YOUR DOCTOR IF YOU HAVE ANY OF THE FOLLOWING:
Bleeding that does not stop/Pain not relieved by Medications/Fever greater than (need to indicate Fahrenheit or Celsius)/Wound/Surgical Site with redness, or foul smelling discharge or pus/Nausea and vomiting that does not stop/Unable to urinate/Increased irritability or sluggishness Bleeding that does not stop/Swelling that gets worse/Pain not relieved by Medications/Fever greater than (need to indicate Fahrenheit or Celsius)/Wound/Surgical Site with redness, or foul smelling discharge or pus/Nausea and vomiting that does not stop/Unable to urinate/Inability to tolerate liquids or foods/Increased irritability or sluggishness

## 2023-02-02 NOTE — ASU PATIENT PROFILE, ADULT - NSICDXPASTMEDICALHX_GEN_ALL_CORE_FT
PAST MEDICAL HISTORY:  Benign prostatic hyperplasia, unspecified whether lower urinary tract symptoms present     Unilateral inguinal hernia, without obstruction or gangrene, not specified as recurrent      PAST MEDICAL HISTORY:  ADHD     Benign prostatic hyperplasia, unspecified whether lower urinary tract symptoms present     Unilateral inguinal hernia, without obstruction or gangrene, not specified as recurrent

## 2023-02-02 NOTE — BRIEF OPERATIVE NOTE - NSICDXBRIEFPROCEDURE_GEN_ALL_CORE_FT
PROCEDURES:  Repair, hernia, inguinal, open, using mesh, adult 02-Feb-2023 09:09:38  Amanda Lobo A

## 2023-02-02 NOTE — ASU PATIENT PROFILE, ADULT - FALL HARM RISK - RISK INTERVENTIONS

## 2023-02-02 NOTE — ASU PATIENT PROFILE, ADULT - NSICDXPASTSURGICALHX_GEN_ALL_CORE_FT
PAST SURGICAL HISTORY:  S/P skin biopsy      PAST SURGICAL HISTORY:  Papilloma of tongue removal about 40 years ago    S/P skin biopsy

## 2023-02-02 NOTE — ASU DISCHARGE PLAN (ADULT/PEDIATRIC) - CARE PROVIDER_API CALL
Blaze Dempsey)  ColonRectal Surgery; Surgery  [address] [facility], [address]  [place], [place] [id number]  Phone: [phone]  Fax: [phone]  Follow Up Time: 2 weeks

## 2023-02-02 NOTE — ASU DISCHARGE PLAN (ADULT/PEDIATRIC) - ASU DC SPECIAL INSTRUCTIONSFT
take metamucil otc  once daily as directed for 1 week take metamucil otc  once daily as directed for 1 week    apply ice pack to r groin daily for 2 days

## 2023-02-08 LAB — SURGICAL PATHOLOGY STUDY: SIGNIFICANT CHANGE UP

## 2023-02-10 ENCOUNTER — APPOINTMENT (OUTPATIENT)
Dept: SURGERY | Facility: CLINIC | Age: 83
End: 2023-02-10
Payer: MEDICARE

## 2023-02-10 VITALS — HEIGHT: 66 IN | BODY MASS INDEX: 25.39 KG/M2 | WEIGHT: 158 LBS | TEMPERATURE: 98 F

## 2023-02-10 DIAGNOSIS — K40.90 UNILATERAL INGUINAL HERNIA, W/OUT OBSTRUCTION OR GANGRENE, NOT SPECIFIED AS RECURRENT: ICD-10-CM

## 2023-02-10 PROBLEM — F90.9 ATTENTION-DEFICIT HYPERACTIVITY DISORDER, UNSPECIFIED TYPE: Chronic | Status: ACTIVE | Noted: 2023-02-02

## 2023-02-10 PROCEDURE — 99024 POSTOP FOLLOW-UP VISIT: CPT

## 2023-02-10 NOTE — PHYSICAL EXAM
[Abdominal Masses] : No abdominal masses [Abdomen Tenderness] : ~T ~M No abdominal tenderness [de-identified] : RIH incision healing well

## 2023-09-05 ENCOUNTER — NON-APPOINTMENT (OUTPATIENT)
Age: 83
End: 2023-09-05

## 2024-01-13 ENCOUNTER — NON-APPOINTMENT (OUTPATIENT)
Age: 84
End: 2024-01-13

## 2025-03-12 NOTE — ED ADULT NURSE NOTE - PAIN RATING/NUMBER SCALE (0-10): REST
intolerance.   Genitourinary:  Negative for difficulty urinating, dysuria, flank pain, frequency, hematuria and urgency.   Musculoskeletal:  Negative for arthralgias, back pain, joint swelling and myalgias.   Skin:  Negative for color change, pallor, rash and wound.   Allergic/Immunologic: Negative for environmental allergies and food allergies.   Neurological:  Negative for dizziness, seizures, syncope, weakness, light-headedness, numbness and headaches.   Hematological:  Negative for adenopathy. Does not bruise/bleed easily.   Psychiatric/Behavioral:  Negative for confusion, sleep disturbance and suicidal ideas. The patient is not nervous/anxious and is not hyperactive.        Vitals:    03/12/25 1008   BP: 134/72   Pulse: 72   Resp: 16   Temp: 96.8 °F (36 °C)   TempSrc: Temporal   SpO2: 98%   Weight: 89.4 kg (197 lb)       Physical Exam  Constitutional:       Appearance: Normal appearance. He is well-developed.   HENT:      Head: Normocephalic and atraumatic.      Right Ear: Hearing normal.      Left Ear: Hearing normal.      Nose: No mucosal edema.      Right Sinus: No maxillary sinus tenderness or frontal sinus tenderness.      Left Sinus: No maxillary sinus tenderness or frontal sinus tenderness.      Mouth/Throat:      Tonsils: No tonsillar abscesses.   Eyes:      Extraocular Movements: Extraocular movements intact.      Pupils: Pupils are equal, round, and reactive to light.   Cardiovascular:      Rate and Rhythm: Normal rate and regular rhythm.      Pulses: Normal pulses.      Heart sounds: Normal heart sounds.   Pulmonary:      Effort: Pulmonary effort is normal.      Breath sounds: Normal breath sounds.   Lymphadenopathy:      Head:      Right side of head: No submental, submandibular, tonsillar, preauricular, posterior auricular or occipital adenopathy.      Left side of head: No submental, submandibular, tonsillar, preauricular, posterior auricular or occipital adenopathy.   Skin:     General: Skin is  0

## 2025-07-11 NOTE — ASU DISCHARGE PLAN (ADULT/PEDIATRIC) - NS DC INTERPRETER YES NO
End of Shift Note    Bedside shift change report given to COTY Carty (oncoming nurse) by AHSAN NASH RN (offgoing nurse).  Report included the following information SBAR, Kardex, ED Summary, Intake/Output, MAR, Recent Results, Cardiac Rhythm NSR,SB, and Alarm Parameters     Shift worked:  7-7     Shift summary and any significant changes:     Pt still having a lot of R flank pain  requesting  iv pain med. Drinks a lot and voids a lot . Low grade temp this pm and she had me notify MD.  Respiratory panel molecular ordered will have to call lab.     Concerns for physician to address:  Md came back to answer pt's questions     Zone phone for oncoming shift:          Activity:  Level of Assistance: Independent  Number times ambulated in room past shift: 4  Number of times OOB to chair past shift: 0    Cardiac:   Cardiac Monitoring: Yes      Cardiac Rhythm: Sinus rhythm    Access:  Current line(s): PIV     Genitourinary:   Urinary Status: Voiding    Respiratory:   O2 Device: None (Room air)  Chronic home O2 use?: NO  Incentive spirometer at bedside: NO    GI:  Last BM (including prior to admit): 07/09/25  Current diet:  ADULT DIET; Regular  Passing flatus: NO    Pain Management:   Patient states pain is manageable on current regimen: YES    Skin:  Vish Scale Score: 21  Interventions: Wound Offloading (Prevention Methods): Bed, pressure reduction mattress, Elevate heels, Pillows, Repositioning, Special mattress (abulating)    Patient Safety:  Fall Risk: Nursing Judgement-Fall Risk High(Add Comments): No  Fall Risk Interventions  Nursing Judgement-Fall Risk High(Add Comments): No  Toilet Every 2 Hours-In Advance of Need: Yes  Hourly Visual Checks: Awake, In bed  Fall Visual Posted: Socks  Room Door Open: Deferred to promote rest  Alarm On: Bed  Patient Moved Closer to Nursing Station: Yes    Active Consults:   IP CONSULT TO HOSPITALIST  IP CONSULT TO NEPHROLOGY    Length of Stay:  Expected LOS: 3  Actual LOS: 2    AHSAN REGALADO  No

## (undated) DEVICE — DRAIN PENROSE .25" X 12" SILICONE

## (undated) DEVICE — PACK MINOR

## (undated) DEVICE — SUT CHROMIC 2-0 60" REEL

## (undated) DEVICE — SUT POLYSORB 3-0 30" V-20 UNDYED

## (undated) DEVICE — SPECIMEN CONTAINER 100ML

## (undated) DEVICE — POSITIONER FOAM HEAD CRADLE (PINK)

## (undated) DEVICE — ELCTR BOVIE PENCIL SMOKE EVACUATION

## (undated) DEVICE — DRSG CURITY GAUZE SPONGE 4 X 4" 12-PLY

## (undated) DEVICE — LAP PAD 18 X 18"

## (undated) DEVICE — SOL IRR POUR NS 0.9% 500ML

## (undated) DEVICE — ELCTR STRYKER NEPTUNE SMOKE EVACUATION PENCIL (GREEN)

## (undated) DEVICE — PREP CHLORAPREP HI-LITE ORANGE 26ML

## (undated) DEVICE — SUT VICRYL 4-0 27" SH

## (undated) DEVICE — CANISTER SUCTION LID GUARD 3000CC

## (undated) DEVICE — SUT POLYSORB 2-0 18" V-20

## (undated) DEVICE — STAPLER SKIN VISI-STAT 35 WIDE

## (undated) DEVICE — DRAPE LIGHT HANDLE COVER (GREEN)

## (undated) DEVICE — GLV 7.5 ULTRAFREE MAX

## (undated) DEVICE — WARMING BLANKET UPPER ADULT

## (undated) DEVICE — SUT SURGIPRO 2-0 30" V-20

## (undated) DEVICE — DRSG STERISTRIPS 0.5 X 4"

## (undated) DEVICE — VENODYNE/SCD SLEEVE CALF MEDIUM

## (undated) DEVICE — GLV 6.5 PROTEXIS (WHITE)

## (undated) DEVICE — SUT CHROMIC 2-0 54" REEL

## (undated) DEVICE — SUT SURGIPRO 0 30" GS-22

## (undated) DEVICE — SUT POLYSORB 3-0 18" V-20 UNDYED

## (undated) DEVICE — SUT POLYSORB 4-0 30" C-13 UNDYED

## (undated) DEVICE — SOLIDIFIER CANN EXPRESS 3K

## (undated) DEVICE — SOL IRR POUR H2O 1500ML